# Patient Record
Sex: MALE | Race: WHITE | Employment: FULL TIME | URBAN - METROPOLITAN AREA
[De-identification: names, ages, dates, MRNs, and addresses within clinical notes are randomized per-mention and may not be internally consistent; named-entity substitution may affect disease eponyms.]

---

## 2018-08-26 ENCOUNTER — APPOINTMENT (OUTPATIENT)
Dept: GENERAL RADIOLOGY | Age: 48
End: 2018-08-26
Attending: EMERGENCY MEDICINE
Payer: COMMERCIAL

## 2018-08-26 ENCOUNTER — HOSPITAL ENCOUNTER (EMERGENCY)
Age: 48
Discharge: HOME | End: 2018-08-26
Attending: EMERGENCY MEDICINE
Payer: COMMERCIAL

## 2018-08-26 VITALS
SYSTOLIC BLOOD PRESSURE: 126 MMHG | HEIGHT: 68 IN | OXYGEN SATURATION: 97 % | WEIGHT: 130 LBS | HEART RATE: 64 BPM | BODY MASS INDEX: 19.7 KG/M2 | DIASTOLIC BLOOD PRESSURE: 74 MMHG | RESPIRATION RATE: 18 BRPM

## 2018-08-26 DIAGNOSIS — E86.0 DEHYDRATION: Primary | ICD-10-CM

## 2018-08-26 LAB
ALBUMIN SERPL-MCNC: 4.9 G/DL (ref 3.5–4.8)
ALBUMIN/GLOB SERPL: 1.3 {RATIO} (ref 1–2)
ALP LIVER SERPL-CCNC: 60 U/L (ref 45–117)
ALT SERPL-CCNC: 26 U/L (ref 17–63)
ANION GAP SERPL CALC-SCNC: 15 MMOL/L (ref 0–18)
AST SERPL-CCNC: 27 U/L (ref 15–41)
BASOPHILS # BLD AUTO: 0.1 X10(3) UL (ref 0–0.1)
BASOPHILS NFR BLD AUTO: 0.9 %
BILIRUB SERPL-MCNC: 1.4 MG/DL (ref 0.1–2)
BUN BLD-MCNC: 19 MG/DL (ref 8–20)
BUN/CREAT SERPL: 13.7 (ref 10–20)
CALCIUM BLD-MCNC: 10.2 MG/DL (ref 8.3–10.3)
CHLORIDE SERPL-SCNC: 103 MMOL/L (ref 101–111)
CO2 SERPL-SCNC: 19 MMOL/L (ref 22–32)
CREAT BLD-MCNC: 1.39 MG/DL (ref 0.7–1.3)
EOSINOPHIL # BLD AUTO: 0.14 X10(3) UL (ref 0–0.3)
EOSINOPHIL NFR BLD AUTO: 1.3 %
ERYTHROCYTE [DISTWIDTH] IN BLOOD BY AUTOMATED COUNT: 12.5 % (ref 11.5–16)
GLOBULIN PLAS-MCNC: 3.8 G/DL (ref 2.5–4)
GLUCOSE BLD-MCNC: 129 MG/DL (ref 70–99)
HCT VFR BLD AUTO: 45.5 % (ref 37–53)
HGB BLD-MCNC: 16.3 G/DL (ref 13–17)
IMMATURE GRANULOCYTE COUNT: 0.03 X10(3) UL (ref 0–1)
IMMATURE GRANULOCYTE RATIO %: 0.3 %
LYMPHOCYTES # BLD AUTO: 1.83 X10(3) UL (ref 0.9–4)
LYMPHOCYTES NFR BLD AUTO: 16.8 %
M PROTEIN MFR SERPL ELPH: 8.7 G/DL (ref 6.1–8.3)
MCH RBC QN AUTO: 31.8 PG (ref 27–33.2)
MCHC RBC AUTO-ENTMCNC: 35.8 G/DL (ref 31–37)
MCV RBC AUTO: 88.9 FL (ref 80–99)
MONOCYTES # BLD AUTO: 0.59 X10(3) UL (ref 0.1–1)
MONOCYTES NFR BLD AUTO: 5.4 %
NEUTROPHIL ABS PRELIM: 8.2 X10 (3) UL (ref 1.3–6.7)
NEUTROPHILS # BLD AUTO: 8.2 X10(3) UL (ref 1.3–6.7)
NEUTROPHILS NFR BLD AUTO: 75.3 %
OSMOLALITY SERPL CALC.SUM OF ELEC: 288 MOSM/KG (ref 275–295)
PLATELET # BLD AUTO: 347 10(3)UL (ref 150–450)
POTASSIUM SERPL-SCNC: 3.5 MMOL/L (ref 3.6–5.1)
RBC # BLD AUTO: 5.12 X10(6)UL (ref 4.3–5.7)
RED CELL DISTRIBUTION WIDTH-SD: 36.2 FL (ref 35.1–46.3)
SODIUM SERPL-SCNC: 137 MMOL/L (ref 136–144)
TROPONIN I SERPL-MCNC: <0.046 NG/ML (ref ?–0.05)
WBC # BLD AUTO: 10.9 X10(3) UL (ref 4–13)

## 2018-08-26 PROCEDURE — 93010 ELECTROCARDIOGRAM REPORT: CPT

## 2018-08-26 PROCEDURE — 80053 COMPREHEN METABOLIC PANEL: CPT | Performed by: EMERGENCY MEDICINE

## 2018-08-26 PROCEDURE — 96361 HYDRATE IV INFUSION ADD-ON: CPT

## 2018-08-26 PROCEDURE — 84484 ASSAY OF TROPONIN QUANT: CPT | Performed by: EMERGENCY MEDICINE

## 2018-08-26 PROCEDURE — 96360 HYDRATION IV INFUSION INIT: CPT

## 2018-08-26 PROCEDURE — 93005 ELECTROCARDIOGRAM TRACING: CPT

## 2018-08-26 PROCEDURE — 99285 EMERGENCY DEPT VISIT HI MDM: CPT

## 2018-08-26 PROCEDURE — 71045 X-RAY EXAM CHEST 1 VIEW: CPT | Performed by: EMERGENCY MEDICINE

## 2018-08-26 PROCEDURE — 85025 COMPLETE CBC W/AUTO DIFF WBC: CPT | Performed by: EMERGENCY MEDICINE

## 2018-08-26 NOTE — ED PROVIDER NOTES
Patient Seen in: [facility] Emergency Department In [place]    History   Patient presents with:  Dizziness (neurologic)    Stated Complaint: dizzy, lightheaded, + SOB, denies chest pain,     HPI    43-year-old male comes the hospital to complaint of feeli other systems reviewed and negative except as noted above.     Physical Exam   ED Triage Vitals [08/26/18 1628]  BP: 149/82  Pulse: 82  Resp: 20  Temp: n/a  Temp src: Oral  SpO2: 100 %  O2 Device: None (Room air)    Current:/74   Pulse 64   Resp 18 Report. Rate: 97  Rhythm: Sinus Rhythm  Reading: Agreed         Xr Chest Ap Portable  (cpt=71045)    Result Date: 8/26/2018  PROCEDURE:  XR CHEST AP PORTABLE  (CPT=71045)  TECHNIQUE:  AP chest radiograph was obtained. COMPARISON:  None.   INDICATIONS:  di

## 2018-08-27 LAB
ATRIAL RATE: 97 BPM
P AXIS: 73 DEGREES
P-R INTERVAL: 152 MS
Q-T INTERVAL: 358 MS
QRS DURATION: 76 MS
QTC CALCULATION (BEZET): 454 MS
R AXIS: 4 DEGREES
T AXIS: 63 DEGREES
VENTRICULAR RATE: 97 BPM

## 2019-07-26 PROCEDURE — 88305 TISSUE EXAM BY PATHOLOGIST: CPT

## 2019-07-26 PROCEDURE — 88304 TISSUE EXAM BY PATHOLOGIST: CPT

## 2021-06-29 PROBLEM — R40.0 DAYTIME SOMNOLENCE: Status: ACTIVE | Noted: 2021-06-29

## 2021-06-29 PROBLEM — R06.83 SNORING: Status: ACTIVE | Noted: 2021-06-29

## 2021-06-29 PROBLEM — I10 BENIGN ESSENTIAL HTN: Status: ACTIVE | Noted: 2021-06-29

## 2021-06-29 PROBLEM — I48.92 ATRIAL FLUTTER, UNSPECIFIED TYPE (HCC): Status: ACTIVE | Noted: 2021-06-29

## 2022-03-01 PROBLEM — I48.0 PAROXYSMAL ATRIAL FIBRILLATION (HCC): Status: ACTIVE | Noted: 2022-03-01

## 2024-05-28 RX ORDER — ROSUVASTATIN CALCIUM 5 MG/1
5 TABLET, COATED ORAL NIGHTLY
COMMUNITY

## 2024-05-28 RX ORDER — LEVOTHYROXINE SODIUM 0.1 MG/1
100 TABLET ORAL
COMMUNITY

## 2024-05-28 NOTE — PAT NURSING NOTE
Per PAT encounter/Bureaux A Partagerhart message sent to pt:    PreOp Instructions     You are scheduled for: a Cardiac Procedure     Date of Procedure: 06/04/24 Tuesday     Diet Instructions: Do not eat or drink anything after midnight (if you are the first case of the day, you will be instructed to fast from 10 pm on Monday night 6/3).     Medications: Medications you are allowed to take can be taken with a sip of water the morning of your procedure     Do not take the following Blood Thinner(s): Last dose of Eliquis will be Monday morning 6/3; Do NOT take your doses Monday evening 6/3 and Tuesday morning 6/4.     Medications to Stop: Hold herbal supplements and vitamins morning of the procedure 6/4.     Skin Prep: Shower with antibacterial soap using a clean washcloth, prior to procedure     Arrival Time: The day prior to your procedure (Monday) you will receive a phone call before 6:00 pm with your arrival time. If you haven't received a phone call, please check your voicemail messages., If you did not receive a voice mail and it is after 6:00 pm, please call the nursing supervisor at [phone].    Driving After Procedure: If sedation is given, you WILL NOT be able to drive home. You will need a responsible adult  to drive you home., Cannot take uber or cab unless approved by physician     Discharge Teaching: Your nurse will give you specific instructions before discharge, Most people can resume normal activities in 2-3 days, Any questions, please call the physician's office     [place] parking is available starting at 6 am or park in the [place] parking garage at [facility]. Check in at the [facility] reception desk. Our  will be there to check you in for your procedure. Please bring your insurance cards and ID with you.                                                                                                                                      Please DO NOT respond to this  message, the inbasket is not monitored for messages. For any questions, please call the physician's office.

## 2024-06-03 ENCOUNTER — ANESTHESIA EVENT (OUTPATIENT)
Dept: INTERVENTIONAL RADIOLOGY/VASCULAR | Facility: HOSPITAL | Age: 54
End: 2024-06-03
Payer: COMMERCIAL

## 2024-06-03 NOTE — ANESTHESIA PREPROCEDURE EVALUATION
PRE-OP EVALUATION    Patient Name: John E Bamberger Jr.    Admit Diagnosis: A-fib (HCC) [I48.91]  Atrial flutter (HCC) [I48.92]    Pre-op Diagnosis: * No surgery found *        Anesthesia Procedure: CATH EP    * Surgery not found *Electrophysiologic study of heart , cryo or radiofrequency ablation of atrial fibrillation    Pre-op vitals reviewed.  Temp: 98 °F (36.7 °C)  Pulse: 58  Resp: 18  BP: 170/100  SpO2: 97 %  Body mass index is 22.81 kg/m².    Current medications reviewed.  Hospital Medications:   [START ON 6/5/2024] chlorhexidine (Hibiclens) 4 % external liquid   Topical On Call    [START ON 6/5/2024] sodium chloride 0.9% infusion   Intravenous On Call       Outpatient Medications:     Medications Prior to Admission   Medication Sig Dispense Refill Last Dose    apixaban 5 MG Oral Tab Take 1 tablet (5 mg total) by mouth 2 (two) times daily.   6/3/2024 at am    levothyroxine 100 MCG Oral Tab Take 1 tablet (100 mcg total) by mouth before breakfast.   6/3/2024    rosuvastatin 5 MG Oral Tab Take 1 tablet (5 mg total) by mouth nightly.   6/3/2024    metoprolol succinate (TOPROL XL) 25 MG Oral Tablet 24 Hr Take 1 tablet (25 mg total) by mouth daily. 90 tablet 3 6/3/2024    Multiple Vitamin (MULTI VITAMIN MENS OR) Take  by mouth.       Glucosamine-Chondroitin (GLUCOSAMINE CHONDR COMPLEX OR) Take  by mouth.       Ascorbic Acid (VITAMIN C) 500 MG Oral Tab Take 1 tablet (500 mg total) by mouth daily.          Allergies: Patient has no known allergies.      Anesthesia Evaluation    Patient summary reviewed.    Anesthetic Complications  (-) history of anesthetic complications         GI/Hepatic/Renal                                 Cardiovascular      ECG reviewed.            (+) hypertension                  (+) dysrhythmias and atrial fibrillation                  Endo/Other    Negative endo/other ROS.                              Pulmonary  Comment: Snoring and day time somnolence   Has not been tested for SAM                          Neuro/Psych    Negative neuro/psych ROS.                                  Past Surgical History:   Procedure Laterality Date    Exc skin benig 1.1-2cm face,facial Right 11/3/2015    Procedure: EXCISIONAL BIOPSY CHEEK MASS;  Surgeon: Lencho Macedo MD;  Location: [facility]    Layr clos wnd face,facial <2.5cm Right 11/3/2015    Procedure: EXCISIONAL BIOPSY CHEEK MASS;  Surgeon: Lencho Macedo MD;  Location: [facility]    Other surgical history      wisdom teeth extraction    Other surgical history      cyst excision    Other surgical history  07/26/2019    EXCISION OF SEBACEOUS CYST ON BACK AND EXCISION OF MOLE ON BACK     Social History     Socioeconomic History    Marital status:     Number of children: 2   Occupational History    Occupation: [employer]   Tobacco Use    Smoking status: Never    Smokeless tobacco: Never   Vaping Use    Vaping status: Never Used   Substance and Sexual Activity    Alcohol use: Yes     Alcohol/week: 0.8 standard drinks of alcohol     Types: 1 Standard drinks or equivalent per week     Comment: Social    Drug use: No    Sexual activity: Yes     Partners: Female   Other Topics Concern     Service No    Blood Transfusions No    Sleep Concern Yes     Comment: snoring, ?apnea    Exercise Yes     Comment: runs 30-35 miles per week    Seat Belt Yes     History   Drug Use No     Available pre-op labs reviewed.               Airway      Mallampati: III  Mouth opening: 3 FB  TM distance: > 6 cm  Neck ROM: full Cardiovascular    Cardiovascular exam normal.  Rhythm: regular  Rate: normal  (-) murmur   Dental             Pulmonary    Pulmonary exam normal.  Breath sounds clear to auscultation bilaterally.               Other findings              ASA: 2   Plan: general  NPO status verified and patient meets guidelines.  Patient has taken beta blockers in last 24 hours.      Comment: GA with OETT/LMA explained to patient.  Risks/benefits explained including but not limited to sore throat, hoarse voice, nausea, dental trauma, eye injury,pulmonary complication and other complications as discussed in anesthesia consent form. Need for arterial line explained.Patient agrees to proceed. Anesthesia consent signed.     Plan/risks discussed with: patient and spouse                Present on Admission:  **None**

## 2024-06-04 ENCOUNTER — ANESTHESIA (OUTPATIENT)
Dept: INTERVENTIONAL RADIOLOGY/VASCULAR | Facility: HOSPITAL | Age: 54
End: 2024-06-04
Payer: COMMERCIAL

## 2024-06-04 ENCOUNTER — HOSPITAL ENCOUNTER (OUTPATIENT)
Dept: INTERVENTIONAL RADIOLOGY/VASCULAR | Facility: HOSPITAL | Age: 54
Discharge: HOME | End: 2024-06-04
Attending: INTERNAL MEDICINE | Admitting: INTERNAL MEDICINE
Payer: COMMERCIAL

## 2024-06-04 VITALS
SYSTOLIC BLOOD PRESSURE: 127 MMHG | RESPIRATION RATE: 23 BRPM | DIASTOLIC BLOOD PRESSURE: 84 MMHG | WEIGHT: 150 LBS | HEIGHT: 68 IN | TEMPERATURE: 97 F | HEART RATE: 73 BPM | BODY MASS INDEX: 22.73 KG/M2 | OXYGEN SATURATION: 99 %

## 2024-06-04 DIAGNOSIS — I48.92 ATRIAL FLUTTER (HCC): ICD-10-CM

## 2024-06-04 DIAGNOSIS — I48.91 A-FIB (HCC): ICD-10-CM

## 2024-06-04 LAB — ISTAT ACTIVATED CLOTTING TIME: 239 SECONDS (ref 74–137)

## 2024-06-04 PROCEDURE — 93657 TX L/R ATRIAL FIB ADDL: CPT | Performed by: INTERNAL MEDICINE

## 2024-06-04 PROCEDURE — 85347 COAGULATION TIME ACTIVATED: CPT

## 2024-06-04 PROCEDURE — B24CZZ3 ULTRASONOGRAPHY OF PERICARDIUM, INTRAVASCULAR: ICD-10-PCS | Performed by: INTERNAL MEDICINE

## 2024-06-04 PROCEDURE — 93656 COMPRE EP EVAL ABLTJ ATR FIB: CPT | Performed by: INTERNAL MEDICINE

## 2024-06-04 PROCEDURE — 4A023FZ MEASUREMENT OF CARDIAC RHYTHM, PERCUTANEOUS APPROACH: ICD-10-PCS | Performed by: INTERNAL MEDICINE

## 2024-06-04 PROCEDURE — 02583ZZ DESTRUCTION OF CONDUCTION MECHANISM, PERCUTANEOUS APPROACH: ICD-10-PCS | Performed by: INTERNAL MEDICINE

## 2024-06-04 PROCEDURE — 02K83ZZ MAP CONDUCTION MECHANISM, PERCUTANEOUS APPROACH: ICD-10-PCS | Performed by: INTERNAL MEDICINE

## 2024-06-04 PROCEDURE — 4A0234Z MEASUREMENT OF CARDIAC ELECTRICAL ACTIVITY, PERCUTANEOUS APPROACH: ICD-10-PCS | Performed by: INTERNAL MEDICINE

## 2024-06-04 PROCEDURE — 93655 ICAR CATH ABLTJ DSCRT ARRHYT: CPT | Performed by: INTERNAL MEDICINE

## 2024-06-04 PROCEDURE — B246ZZ3 ULTRASONOGRAPHY OF RIGHT AND LEFT HEART, INTRAVASCULAR: ICD-10-PCS | Performed by: INTERNAL MEDICINE

## 2024-06-04 RX ORDER — SODIUM CHLORIDE 9 MG/ML
INJECTION, SOLUTION INTRAVENOUS
Status: COMPLETED | OUTPATIENT
Start: 2024-06-05 | End: 2024-06-04

## 2024-06-04 RX ORDER — PROTAMINE SULFATE 10 MG/ML
INJECTION, SOLUTION INTRAVENOUS AS NEEDED
Status: DISCONTINUED | OUTPATIENT
Start: 2024-06-04 | End: 2024-06-04 | Stop reason: SURG

## 2024-06-04 RX ORDER — HEPARIN SODIUM 5000 [USP'U]/ML
INJECTION, SOLUTION INTRAVENOUS; SUBCUTANEOUS AS NEEDED
Status: DISCONTINUED | OUTPATIENT
Start: 2024-06-04 | End: 2024-06-04 | Stop reason: SURG

## 2024-06-04 RX ORDER — DEXAMETHASONE SODIUM PHOSPHATE 4 MG/ML
VIAL (ML) INJECTION AS NEEDED
Status: DISCONTINUED | OUTPATIENT
Start: 2024-06-04 | End: 2024-06-04 | Stop reason: SURG

## 2024-06-04 RX ORDER — ROCURONIUM BROMIDE 10 MG/ML
INJECTION, SOLUTION INTRAVENOUS AS NEEDED
Status: DISCONTINUED | OUTPATIENT
Start: 2024-06-04 | End: 2024-06-04 | Stop reason: SURG

## 2024-06-04 RX ORDER — MIDAZOLAM HYDROCHLORIDE 1 MG/ML
INJECTION INTRAMUSCULAR; INTRAVENOUS AS NEEDED
Status: DISCONTINUED | OUTPATIENT
Start: 2024-06-04 | End: 2024-06-04 | Stop reason: SURG

## 2024-06-04 RX ORDER — HEPARIN SODIUM 5000 [USP'U]/ML
INJECTION, SOLUTION INTRAVENOUS; SUBCUTANEOUS
Status: COMPLETED
Start: 2024-06-04 | End: 2024-06-04

## 2024-06-04 RX ORDER — HYDROMORPHONE HYDROCHLORIDE 1 MG/ML
0.2 INJECTION, SOLUTION INTRAMUSCULAR; INTRAVENOUS; SUBCUTANEOUS EVERY 5 MIN PRN
Status: DISCONTINUED | OUTPATIENT
Start: 2024-06-04 | End: 2024-06-04 | Stop reason: HOSPADM

## 2024-06-04 RX ORDER — LIDOCAINE HYDROCHLORIDE 10 MG/ML
INJECTION, SOLUTION EPIDURAL; INFILTRATION; INTRACAUDAL; PERINEURAL AS NEEDED
Status: DISCONTINUED | OUTPATIENT
Start: 2024-06-04 | End: 2024-06-04 | Stop reason: SURG

## 2024-06-04 RX ORDER — CHLORHEXIDINE GLUCONATE 40 MG/ML
SOLUTION TOPICAL
Status: DISCONTINUED | OUTPATIENT
Start: 2024-06-05 | End: 2024-06-04 | Stop reason: HOSPADM

## 2024-06-04 RX ORDER — HYDROMORPHONE HYDROCHLORIDE 1 MG/ML
0.4 INJECTION, SOLUTION INTRAMUSCULAR; INTRAVENOUS; SUBCUTANEOUS EVERY 5 MIN PRN
Status: DISCONTINUED | OUTPATIENT
Start: 2024-06-04 | End: 2024-06-04 | Stop reason: HOSPADM

## 2024-06-04 RX ORDER — LIDOCAINE HYDROCHLORIDE 10 MG/ML
INJECTION, SOLUTION EPIDURAL; INFILTRATION; INTRACAUDAL; PERINEURAL
Status: COMPLETED
Start: 2024-06-04 | End: 2024-06-04

## 2024-06-04 RX ORDER — PROCHLORPERAZINE EDISYLATE 5 MG/ML
5 INJECTION INTRAMUSCULAR; INTRAVENOUS EVERY 8 HOURS PRN
Status: DISCONTINUED | OUTPATIENT
Start: 2024-06-04 | End: 2024-06-04 | Stop reason: HOSPADM

## 2024-06-04 RX ORDER — SODIUM CHLORIDE 9 MG/ML
INJECTION, SOLUTION INTRAVENOUS CONTINUOUS PRN
Status: DISCONTINUED | OUTPATIENT
Start: 2024-06-04 | End: 2024-06-04 | Stop reason: SURG

## 2024-06-04 RX ORDER — HYDROMORPHONE HYDROCHLORIDE 1 MG/ML
0.6 INJECTION, SOLUTION INTRAMUSCULAR; INTRAVENOUS; SUBCUTANEOUS EVERY 5 MIN PRN
Status: DISCONTINUED | OUTPATIENT
Start: 2024-06-04 | End: 2024-06-04 | Stop reason: HOSPADM

## 2024-06-04 RX ORDER — NALOXONE HYDROCHLORIDE 0.4 MG/ML
0.08 INJECTION, SOLUTION INTRAMUSCULAR; INTRAVENOUS; SUBCUTANEOUS AS NEEDED
Status: DISCONTINUED | OUTPATIENT
Start: 2024-06-04 | End: 2024-06-04 | Stop reason: HOSPADM

## 2024-06-04 RX ORDER — ONDANSETRON 2 MG/ML
INJECTION INTRAMUSCULAR; INTRAVENOUS AS NEEDED
Status: DISCONTINUED | OUTPATIENT
Start: 2024-06-04 | End: 2024-06-04 | Stop reason: SURG

## 2024-06-04 RX ORDER — SODIUM CHLORIDE, SODIUM LACTATE, POTASSIUM CHLORIDE, CALCIUM CHLORIDE 600; 310; 30; 20 MG/100ML; MG/100ML; MG/100ML; MG/100ML
INJECTION, SOLUTION INTRAVENOUS CONTINUOUS
Status: DISCONTINUED | OUTPATIENT
Start: 2024-06-04 | End: 2024-06-04 | Stop reason: HOSPADM

## 2024-06-04 RX ORDER — ONDANSETRON 2 MG/ML
4 INJECTION INTRAMUSCULAR; INTRAVENOUS EVERY 6 HOURS PRN
Status: DISCONTINUED | OUTPATIENT
Start: 2024-06-04 | End: 2024-06-04 | Stop reason: HOSPADM

## 2024-06-04 RX ORDER — ACETAMINOPHEN 325 MG/1
650 TABLET ORAL EVERY 6 HOURS PRN
Status: DISCONTINUED | OUTPATIENT
Start: 2024-06-04 | End: 2024-06-04

## 2024-06-04 RX ADMIN — SODIUM CHLORIDE, SODIUM LACTATE, POTASSIUM CHLORIDE, CALCIUM CHLORIDE: 600; 310; 30; 20 INJECTION, SOLUTION INTRAVENOUS at 10:45:00

## 2024-06-04 RX ADMIN — SODIUM CHLORIDE: 9 INJECTION, SOLUTION INTRAVENOUS at 07:28:00

## 2024-06-04 RX ADMIN — PROTAMINE SULFATE 50 MG: 10 INJECTION, SOLUTION INTRAVENOUS at 10:12:00

## 2024-06-04 RX ADMIN — MIDAZOLAM HYDROCHLORIDE 2 MG: 1 INJECTION INTRAMUSCULAR; INTRAVENOUS at 07:42:00

## 2024-06-04 RX ADMIN — ONDANSETRON 4 MG: 2 INJECTION INTRAMUSCULAR; INTRAVENOUS at 07:51:00

## 2024-06-04 RX ADMIN — SODIUM CHLORIDE: 9 INJECTION, SOLUTION INTRAVENOUS at 09:17:00

## 2024-06-04 RX ADMIN — DEXAMETHASONE SODIUM PHOSPHATE 4 MG: 4 MG/ML VIAL (ML) INJECTION at 07:51:00

## 2024-06-04 RX ADMIN — HEPARIN SODIUM 7500 UNITS: 5000 INJECTION, SOLUTION INTRAVENOUS; SUBCUTANEOUS at 09:41:00

## 2024-06-04 RX ADMIN — HEPARIN SODIUM 6000 UNITS: 5000 INJECTION, SOLUTION INTRAVENOUS; SUBCUTANEOUS at 08:59:00

## 2024-06-04 RX ADMIN — ROCURONIUM BROMIDE 40 MG: 10 INJECTION, SOLUTION INTRAVENOUS at 07:44:00

## 2024-06-04 RX ADMIN — SODIUM CHLORIDE: 9 INJECTION, SOLUTION INTRAVENOUS at 07:36:00

## 2024-06-04 RX ADMIN — ROCURONIUM BROMIDE 10 MG: 10 INJECTION, SOLUTION INTRAVENOUS at 08:30:00

## 2024-06-04 RX ADMIN — HEPARIN SODIUM 5000 UNITS: 5000 INJECTION, SOLUTION INTRAVENOUS; SUBCUTANEOUS at 08:45:00

## 2024-06-04 RX ADMIN — LIDOCAINE HYDROCHLORIDE 50 MG: 10 INJECTION, SOLUTION EPIDURAL; INFILTRATION; INTRACAUDAL; PERINEURAL at 07:44:00

## 2024-06-04 NOTE — ANESTHESIA POSTPROCEDURE EVALUATION
[facility]    John E Bamberger Jr. Patient Status:  Outpatient   Age/Gender 54 year old male MRN [id number]   Location [facility] POST ANESTHESIA CARE UNIT Attending Pablo Wolfe MD   Hosp Day # 0 PCP Kamar Barth MD       Anesthesia Post-op Note        Procedure Summary       Date: 06/04/24 Room / Location: [facility] Interventional Suites    Anesthesia Start: 0729 Anesthesia Stop: 1040    Procedure: CATH EP Diagnosis:       A-fib (HCC)      Atrial flutter (HCC)      A-fib (HCC)      Atrial flutter (HCC)    Scheduled Providers: Lencho Chavez MD Anesthesiologist: Lencho Chavez MD    Anesthesia Type: general ASA Status: 2            Anesthesia Type: general    Vitals Value Taken Time   /72 06/04/24 1041   Temp 97.1 06/04/24 1041   Pulse 71 06/04/24 1041   Resp 12 06/04/24 1041   SpO2 98 06/04/24 1041       Patient Location: PACU    Anesthesia Type: general    Airway Patency: patent and extubated    Postop Pain Control: adequate    Mental Status: mildly sedated but able to meaningfully participate in the post-anesthesia evaluation    Nausea/Vomiting: none    Cardiopulmonary/Hydration status: stable euvolemic    Complications: no apparent anesthesia related complications    Postop vital signs: stable    Comments: Report to Karen JAMISON    Dental Exam: Unchanged from Preop    Patient to be discharged from PACU when criteria met.

## 2024-06-04 NOTE — PROCEDURES
Electrophysiology Study with Ablation      History:  John E Bamberger Jr. is a 54 year old year old male with paroxysmal atrial fibrillation and atrial flutter who presents for an ablation. The risks (including, but not limited to, hematoma, vascular damage, pneumothorax, tamponade, need for a pacemaker, phrenic nerve injury, myocardial infarction, stroke, and death), benefits (less or no atrial fibrillation and atrial flutter), and alternatives (rate control, anti-arrhythmic drugs) of the procedure were discussed. The patient understands and agrees to proceed.      Procedure:  The patient was brought to the electrophysiology study in the fasting and nonsedated state. The left and right groins were prepped and draped in the usual sterile fashion. General anesthesia was administered by the anesthesia service. 1% lidocaine was used for skin anesthesia. 7, 8, and 9 Fr sheaths were placed in the right femoral vein using ultrasound guidance. Catheters were placed in the appropriate positions under intracardiac echo guidance. An esophageal cooling balloon was placed by anesthesia and position was confirmed.    Procedures performed:  Comprehensive EP study  Pacing and recording of the LA from the CS  Attempted induction of arrhythmia  3D mapping of tachycardia with CARTO  Radiofrequency ablation  Intracardiac echo  Transseptal access to the LA    After programmed stimulation and ablation, the catheters and sheaths were removed and hemostasis was achieved with Vascade.    Results:  Baseline intervals: SCL: 800, SD: 160, QRS: 80    Bidirectional conduction across the cavotricuspid isthmus was confirmed by pacing on either side of the isthmus and recording electrograms from the contralateral side.    Mapping and Ablation:  The cavotricuspid isthmus was targeted for ablation. Mapping and ablation was performed during CS pacing. An irrigated force sensing ablation catheter was used through a steerable sheath. CARTO and ICE was  used for mapping and catheter placement. During the lesion set, bidirectional block was achieved.    Transseptal access across the fossa ovalis was obtained using the steerable sheath with an RF [place] wire under intracardiac echo monitoring. Once access was obtained with the needle, the sheath/dilator apparatus was advanced to the LA. Heparin was administered intravenously in doses to maintain an ACT between 300-350s.    Using an octaray catheter, a voltage map was created of the LA.  The PVs were all active. The posterior wall was healthy  High output pacing was performed anterior to the right veins to map any phrenic nerve activity    Using an irrigated force sensing ablation catheter, a set of lesions encircling the left and then right PVs were created. 90W (Qmode plus) was used on the posterior wall and 50W was used on the anterior wall. A set of right carinal lesions was also placed.    Post ablation findings:  Baseline intervals: SCL: 800, MD: 160, QRS: 85    A repeat voltage map was created with the octaray catheter. This demonstrated entrance block. Pacing in the veins demonstrated exit block    No pericardial effusion was seen on ICE.    Bidirectional block across the cavotricuspid isthmus was confirmed by pacing on either side of the isthmus and recording electrograms from the contralateral side for 30 minutes post ablation.      Conclusions:   Radiofrequency ablation of the pulmonary veins for atrial fibrillation   Radiofrequency ablation of the cavotricuspid isthmus for atrial flutter       Pablo Wolfe MD  Clinical Cardiac Electrophysiology  [facility]

## 2024-06-04 NOTE — ANESTHESIA PROCEDURE NOTES
Arterial Line    Date/Time: 6/4/2024 7:50 AM    Performed by: Lencho Chavez MD  Authorized by: Lencho Chavez MD    General Information and Staff    Procedure Start:  6/4/2024 7:50 AM  Procedure End:  6/4/2024 7:51 AM  Anesthesiologist:  Lencho Chavez MD  Performed By:  Anesthesiologist  Patient Location:  OR  Indication: continuous blood pressure monitoring and blood sampling needed    Site Identification: real time ultrasound guided    Preanesthetic Checklist: 2 patient identifiers, IV checked, risks and benefits discussed, monitors and equipment checked, pre-op evaluation, timeout performed, anesthesia consent and sterile technique used    Procedure Details    Catheter Size:  20 G  Catheter Length:  1 and 3/4 inch  Catheter Type:  Arrow  Seldinger Technique?: Yes    Laterality:  Left  Site:  Radial artery  Site Prep: chlorhexidine    Line Secured:  Wrist Brace, tape and Tegaderm    Assessment    Events: patient tolerated procedure well with no complications      Medications  6/4/2024 7:50 AM      Additional Comments

## 2024-06-04 NOTE — PROGRESS NOTES
Pt post RFA, pt awake, vss. Right femoral venous access site is soft. Drsg Is intact with a corner of bloody drainage, will monitor.     Recovery complete per protocol. Vss. Pt has been sitting up in bed, voided and walked. Right fem site remains soft, no signs of bleeding or hematoma. Discharge instructions reviewed, iv dc'd and pt discharged home with wife driving.

## 2024-06-04 NOTE — ANESTHESIA PROCEDURE NOTES
Airway  Date/Time: 6/4/2024 7:46 AM  Urgency: elective    Airway not difficult    General Information and Staff    Patient location during procedure: cath lab  Anesthesiologist: Lencho Chavez MD  Performed: anesthesiologist   Performed by: Lencho Chavez MD  Authorized by: Lencho Chavez MD      Indications and Patient Condition  Indications for airway management: anesthesia  Sedation level: deep  Preoxygenated: yes  Patient position: sniffing  Mask difficulty assessment: 1 - vent by mask    Final Airway Details  Final airway type: endotracheal airway      Successful airway: ETT  Cuffed: yes   Successful intubation technique: Video laryngoscopy  Facilitating devices/methods: intubating stylet  Endotracheal tube insertion site: oral  Blade: GlideScope  Blade size: #3  ETT size (mm): 7.5    Cormack-Lehane Classification: grade I - full view of glottis  Placement verified by: capnometry   Measured from: lips  ETT to lips (cm): 21  Number of attempts at approach: 1

## 2024-06-05 NOTE — PROGRESS NOTES
Follow up call was made. Pt states he is having tingling in his right foot on and off since last night. Pt does not feel a hematoma. He does not have any calf pain or redness. He states his foot color is the same as his left. He states it gets better when he walks. Pt was instructed to call Dr Wolfe if the symptoms do not improve or worsen. Pt verbalizes understanding.       Dr Wolfe was informed. No orders rc'd.

## (undated) NOTE — ED AVS SNAPSHOT
Kingsley Coon. MRN: [id number]    Department:  [facility] Department in [place]   Date of Visit:  8/26/2018           Disclosure     Insurance plans vary and the physician(s) referred by the ER may not be covered by your plan.  Please c tell this physician (or your personal doctor if your instructions are to return to your personal doctor) about any new or lasting problems. The primary care or specialist physician will see patients referred from the [facility] Emergency Department.  David Graff